# Patient Record
Sex: MALE | URBAN - METROPOLITAN AREA
[De-identification: names, ages, dates, MRNs, and addresses within clinical notes are randomized per-mention and may not be internally consistent; named-entity substitution may affect disease eponyms.]

---

## 2019-03-18 ENCOUNTER — OFFICE VISIT (OUTPATIENT)
Dept: URBAN - METROPOLITAN AREA CLINIC 76 | Facility: CLINIC | Age: 69
End: 2019-03-18
Payer: COMMERCIAL

## 2019-03-18 DIAGNOSIS — H25.13 AGE-RELATED NUCLEAR CATARACT, BILATERAL: ICD-10-CM

## 2019-03-18 PROCEDURE — 92004 COMPRE OPH EXAM NEW PT 1/>: CPT | Performed by: OPTOMETRIST

## 2019-03-18 ASSESSMENT — VISUAL ACUITY
OD: 20/20
OS: 20/20

## 2019-03-18 ASSESSMENT — INTRAOCULAR PRESSURE
OD: 14
OS: 16

## 2020-12-18 ENCOUNTER — OFFICE VISIT (OUTPATIENT)
Dept: URBAN - METROPOLITAN AREA CLINIC 76 | Facility: CLINIC | Age: 70
End: 2020-12-18
Payer: COMMERCIAL

## 2020-12-18 DIAGNOSIS — H04.123 DRY EYE SYNDROME OF BILATERAL LACRIMAL GLANDS: ICD-10-CM

## 2020-12-18 DIAGNOSIS — H52.4 PRESBYOPIA: Primary | ICD-10-CM

## 2020-12-18 PROCEDURE — 92014 COMPRE OPH EXAM EST PT 1/>: CPT | Performed by: OPTOMETRIST

## 2020-12-18 ASSESSMENT — VISUAL ACUITY
OD: 20/30
OS: 20/40

## 2020-12-18 ASSESSMENT — KERATOMETRY
OD: 41.63
OS: 42.25

## 2020-12-18 ASSESSMENT — INTRAOCULAR PRESSURE
OD: 12
OS: 14

## 2020-12-18 NOTE — IMPRESSION/PLAN
Impression: Age-related nuclear cataract, bilateral: H25.13. OS>OD. Plan: Discussed condition. Continue to monitor without treatment at this time. OD does not qualify at this point, hold off on Sx until OD qualifies.

## 2020-12-18 NOTE — IMPRESSION/PLAN
Impression: Dry eye syndrome of bilateral lacrimal glands: H04.123. Plan: Discussed diagnosis again in detail with patient. Patient instructed to use emulsive base lubricant 3-4 x a day. Warm compresses with lid massage from top / down, bottom / up, and sweep from inside / out x 2. Increase omega foods and borage oil 1500-2500mg po per day.